# Patient Record
Sex: FEMALE
[De-identification: names, ages, dates, MRNs, and addresses within clinical notes are randomized per-mention and may not be internally consistent; named-entity substitution may affect disease eponyms.]

---

## 2017-12-24 ENCOUNTER — LAB SERVICES (OUTPATIENT)
Dept: OTHER | Age: 52
End: 2017-12-24

## 2017-12-24 ENCOUNTER — CHARTING TRANS (OUTPATIENT)
Dept: OTHER | Age: 52
End: 2017-12-24

## 2017-12-24 LAB — RAPID STREP GROUP A: POSITIVE

## 2018-11-02 VITALS
HEART RATE: 92 BPM | BODY MASS INDEX: 31.89 KG/M2 | HEIGHT: 63 IN | RESPIRATION RATE: 16 BRPM | OXYGEN SATURATION: 99 % | WEIGHT: 180 LBS | TEMPERATURE: 98.1 F

## 2022-10-05 ENCOUNTER — LAB OUTSIDE AN ENCOUNTER (OUTPATIENT)
Dept: URBAN - METROPOLITAN AREA CLINIC 40 | Facility: CLINIC | Age: 57
End: 2022-10-05

## 2022-10-05 ENCOUNTER — DASHBOARD ENCOUNTERS (OUTPATIENT)
Age: 57
End: 2022-10-05

## 2022-10-05 ENCOUNTER — OFFICE VISIT (OUTPATIENT)
Dept: URBAN - METROPOLITAN AREA CLINIC 40 | Facility: CLINIC | Age: 57
End: 2022-10-05
Payer: MEDICARE

## 2022-10-05 VITALS
SYSTOLIC BLOOD PRESSURE: 120 MMHG | BODY MASS INDEX: 28.85 KG/M2 | DIASTOLIC BLOOD PRESSURE: 80 MMHG | WEIGHT: 169 LBS | HEIGHT: 64 IN | HEART RATE: 74 BPM

## 2022-10-05 DIAGNOSIS — Z86.010 HISTORY OF COLON POLYPS: ICD-10-CM

## 2022-10-05 DIAGNOSIS — K59.01 CONSTIPATION: ICD-10-CM

## 2022-10-05 DIAGNOSIS — K92.1 HEMATOCHEZIA: ICD-10-CM

## 2022-10-05 PROCEDURE — 99203 OFFICE O/P NEW LOW 30 MIN: CPT | Performed by: INTERNAL MEDICINE

## 2022-10-05 RX ORDER — PREGABALIN 75 MG/1
AS DIRECTED CAPSULE ORAL
Status: ACTIVE | COMMUNITY

## 2022-10-05 RX ORDER — TIZANIDINE 2 MG/1
AS DIRECTED TABLET ORAL
Status: ACTIVE | COMMUNITY

## 2022-10-05 RX ORDER — POLYETHYLENE GLYCOL 3350, SODIUM SULFATE, SODIUM CHLORIDE, POTASSIUM CHLORIDE, ASCORBIC ACID, SODIUM ASCORBATE 140-9-5.2G
AS DIRECTED KIT ORAL ONCE
Qty: 1 | Refills: 0 | OUTPATIENT
Start: 2022-10-05 | End: 2022-10-06

## 2022-10-05 NOTE — PHYSICAL EXAM GASTROINTESTINAL
Abdomen , soft, diffuse mild tenderness, nondistended , no guarding or rigidity , no masses palpable , normal bowel sounds , Liver and Spleen , no hepatomegaly present , no hepatosplenomegaly , liver nontender , spleen not palpable

## 2022-10-05 NOTE — HPI-TODAY'S VISIT:
Ms. Upton is a 57-year-old black female presenting for new patient evaluation of rectal bleeding.  Patient states that she has had issues intermittently with hematochezia.  Normally she moves her bowels daily.  She has occasional constipation when she takes iron for anemia.  She is noted over the last several weeks that she has had episodes of bright red blood of a large volume in the toilet bowl.  She has had some abdominal cramping along with the bleeding.  She does endorse nausea and reflux.  She thinks she is also had some issues with dysphagia but with liquids.  She states this has some dizziness with the bleeding but no syncopal episodes.  Last colonoscopy was in 2017 or 18 in [place] where polyps were removed.  There is no family history of colon cancer or polyps.  Patient is on ketorolac chronically for her back pain.

## 2022-10-06 ENCOUNTER — TELEPHONE ENCOUNTER (OUTPATIENT)
Dept: URBAN - METROPOLITAN AREA CLINIC 40 | Facility: CLINIC | Age: 57
End: 2022-10-06

## 2022-10-07 PROBLEM — 14760008 CONSTIPATION: Status: ACTIVE | Noted: 2022-10-05

## 2022-10-08 PROBLEM — 428283002 HISTORY OF POLYP OF COLON: Status: ACTIVE | Noted: 2022-10-05

## 2022-10-08 PROBLEM — 405729008 HEMATOCHEZIA: Status: ACTIVE | Noted: 2022-10-07

## 2022-11-15 ENCOUNTER — TELEPHONE ENCOUNTER (OUTPATIENT)
Dept: URBAN - METROPOLITAN AREA CLINIC 40 | Facility: CLINIC | Age: 57
End: 2022-11-15

## 2022-11-18 ENCOUNTER — OFFICE VISIT (OUTPATIENT)
Dept: URBAN - METROPOLITAN AREA SURGERY CENTER 30 | Facility: SURGERY CENTER | Age: 57
End: 2022-11-18

## 2022-12-06 ENCOUNTER — OFFICE VISIT (OUTPATIENT)
Dept: URBAN - METROPOLITAN AREA CLINIC 40 | Facility: CLINIC | Age: 57
End: 2022-12-06

## 2022-12-06 RX ORDER — PREGABALIN 75 MG/1
AS DIRECTED CAPSULE ORAL
Status: ACTIVE | COMMUNITY

## 2022-12-06 RX ORDER — TIZANIDINE 2 MG/1
AS DIRECTED TABLET ORAL
Status: ACTIVE | COMMUNITY